# Patient Record
Sex: FEMALE | Race: WHITE | Employment: FULL TIME | URBAN - METROPOLITAN AREA
[De-identification: names, ages, dates, MRNs, and addresses within clinical notes are randomized per-mention and may not be internally consistent; named-entity substitution may affect disease eponyms.]

---

## 2017-05-05 ENCOUNTER — TELEPHONE (OUTPATIENT)
Dept: FAMILY MEDICINE CLINIC | Facility: CLINIC | Age: 67
End: 2017-05-05

## 2017-10-18 NOTE — TELEPHONE ENCOUNTER
Letter for colonoscopy was returned - unable to deliver and unable to forward. Patient last seen 2012. Please print for Rufina to see if we can have Dr. Xin Winston removed as PCP.